# Patient Record
Sex: MALE | Race: WHITE | Employment: OTHER | URBAN - METROPOLITAN AREA
[De-identification: names, ages, dates, MRNs, and addresses within clinical notes are randomized per-mention and may not be internally consistent; named-entity substitution may affect disease eponyms.]

---

## 2017-02-06 ENCOUNTER — HOSPITAL ENCOUNTER (OUTPATIENT)
Dept: ULTRASOUND IMAGING | Age: 82
Discharge: HOME | End: 2017-02-06
Attending: INTERNAL MEDICINE
Payer: MEDICARE

## 2017-02-06 DIAGNOSIS — I10 ESSENTIAL HYPERTENSION, MALIGNANT: ICD-10-CM

## 2017-02-06 DIAGNOSIS — R79.89 OTHER ABNORMAL BLOOD CHEMISTRY: ICD-10-CM

## 2017-02-06 DIAGNOSIS — R80.9 PROTEINURIA: ICD-10-CM

## 2017-02-06 PROCEDURE — 76770 US EXAM ABDO BACK WALL COMP: CPT

## 2017-11-12 ENCOUNTER — APPOINTMENT (OUTPATIENT)
Dept: GENERAL RADIOLOGY | Age: 82
End: 2017-11-12
Attending: EMERGENCY MEDICINE
Payer: MEDICARE

## 2017-11-12 ENCOUNTER — HOSPITAL ENCOUNTER (EMERGENCY)
Age: 82
Discharge: ANOTHER HOSPITAL | End: 2017-11-12
Attending: EMERGENCY MEDICINE
Payer: MEDICARE

## 2017-11-12 ENCOUNTER — APPOINTMENT (OUTPATIENT)
Dept: CT IMAGING | Age: 82
End: 2017-11-12
Attending: EMERGENCY MEDICINE
Payer: MEDICARE

## 2017-11-12 VITALS
HEIGHT: 70 IN | WEIGHT: 200 LBS | TEMPERATURE: 98.3 F | HEART RATE: 77 BPM | OXYGEN SATURATION: 99 % | BODY MASS INDEX: 28.63 KG/M2 | RESPIRATION RATE: 15 BRPM | SYSTOLIC BLOOD PRESSURE: 125 MMHG | DIASTOLIC BLOOD PRESSURE: 76 MMHG

## 2017-11-12 DIAGNOSIS — S01.81XA FACIAL LACERATION, INITIAL ENCOUNTER: ICD-10-CM

## 2017-11-12 DIAGNOSIS — S09.90XA INJURY OF HEAD, INITIAL ENCOUNTER: ICD-10-CM

## 2017-11-12 DIAGNOSIS — R55 SYNCOPE AND COLLAPSE: Primary | ICD-10-CM

## 2017-11-12 LAB
ALBUMIN SERPL-MCNC: 3.4 G/DL (ref 3.5–5)
ALBUMIN/GLOB SERPL: 0.9 {RATIO} (ref 1.1–2.2)
ALP SERPL-CCNC: 89 U/L (ref 45–117)
ALT SERPL-CCNC: 21 U/L (ref 12–78)
ANION GAP SERPL CALC-SCNC: 10 MMOL/L (ref 5–15)
APTT PPP: 25 SEC (ref 22.1–32.5)
AST SERPL-CCNC: 26 U/L (ref 15–37)
BASOPHILS # BLD: 0 K/UL (ref 0–0.1)
BASOPHILS NFR BLD: 0 % (ref 0–1)
BILIRUB SERPL-MCNC: 0.8 MG/DL (ref 0.2–1)
BUN SERPL-MCNC: 28 MG/DL (ref 6–20)
BUN/CREAT SERPL: 16 (ref 12–20)
CALCIUM SERPL-MCNC: 8.7 MG/DL (ref 8.5–10.1)
CHLORIDE SERPL-SCNC: 105 MMOL/L (ref 97–108)
CO2 SERPL-SCNC: 22 MMOL/L (ref 21–32)
CREAT SERPL-MCNC: 1.79 MG/DL (ref 0.7–1.3)
EOSINOPHIL # BLD: 0.1 K/UL (ref 0–0.4)
EOSINOPHIL NFR BLD: 1 % (ref 0–7)
ERYTHROCYTE [DISTWIDTH] IN BLOOD BY AUTOMATED COUNT: 14 % (ref 11.5–14.5)
GLOBULIN SER CALC-MCNC: 3.6 G/DL (ref 2–4)
GLUCOSE SERPL-MCNC: 112 MG/DL (ref 65–100)
HCT VFR BLD AUTO: 41.8 % (ref 36.6–50.3)
HGB BLD-MCNC: 13.5 G/DL (ref 12.1–17)
INR PPP: 1.1 (ref 0.9–1.1)
LYMPHOCYTES # BLD: 1.7 K/UL (ref 0.8–3.5)
LYMPHOCYTES NFR BLD: 13 % (ref 12–49)
MCH RBC QN AUTO: 31.6 PG (ref 26–34)
MCHC RBC AUTO-ENTMCNC: 32.3 G/DL (ref 30–36.5)
MCV RBC AUTO: 97.9 FL (ref 80–99)
MONOCYTES # BLD: 1 K/UL (ref 0–1)
MONOCYTES NFR BLD: 7 % (ref 5–13)
NEUTS SEG # BLD: 10.2 K/UL (ref 1.8–8)
NEUTS SEG NFR BLD: 79 % (ref 32–75)
PLATELET # BLD AUTO: 201 K/UL (ref 150–400)
POTASSIUM SERPL-SCNC: 5.2 MMOL/L (ref 3.5–5.1)
PROT SERPL-MCNC: 7 G/DL (ref 6.4–8.2)
PROTHROMBIN TIME: 11.5 SEC (ref 9–11.1)
RBC # BLD AUTO: 4.27 M/UL (ref 4.1–5.7)
SODIUM SERPL-SCNC: 137 MMOL/L (ref 136–145)
THERAPEUTIC RANGE,PTTT: NORMAL SECS (ref 58–77)
WBC # BLD AUTO: 13 K/UL (ref 4.1–11.1)

## 2017-11-12 PROCEDURE — 85730 THROMBOPLASTIN TIME PARTIAL: CPT | Performed by: EMERGENCY MEDICINE

## 2017-11-12 PROCEDURE — 70486 CT MAXILLOFACIAL W/O DYE: CPT

## 2017-11-12 PROCEDURE — 72125 CT NECK SPINE W/O DYE: CPT

## 2017-11-12 PROCEDURE — 77030031132 HC SUT NYL COVD -A

## 2017-11-12 PROCEDURE — 93005 ELECTROCARDIOGRAM TRACING: CPT

## 2017-11-12 PROCEDURE — 99285 EMERGENCY DEPT VISIT HI MDM: CPT

## 2017-11-12 PROCEDURE — 70450 CT HEAD/BRAIN W/O DYE: CPT

## 2017-11-12 PROCEDURE — 71010 XR CHEST PORT: CPT

## 2017-11-12 PROCEDURE — 75810000293 HC SIMP/SUPERF WND  RPR

## 2017-11-12 PROCEDURE — 80053 COMPREHEN METABOLIC PANEL: CPT | Performed by: EMERGENCY MEDICINE

## 2017-11-12 PROCEDURE — 36415 COLL VENOUS BLD VENIPUNCTURE: CPT | Performed by: EMERGENCY MEDICINE

## 2017-11-12 PROCEDURE — 85610 PROTHROMBIN TIME: CPT | Performed by: EMERGENCY MEDICINE

## 2017-11-12 PROCEDURE — 74011000250 HC RX REV CODE- 250: Performed by: EMERGENCY MEDICINE

## 2017-11-12 PROCEDURE — 85025 COMPLETE CBC W/AUTO DIFF WBC: CPT | Performed by: EMERGENCY MEDICINE

## 2017-11-12 RX ORDER — BACITRACIN ZINC 500 [USP'U]/G
1 CREAM TOPICAL
Status: COMPLETED | OUTPATIENT
Start: 2017-11-12 | End: 2017-11-12

## 2017-11-12 RX ORDER — LIDOCAINE HYDROCHLORIDE AND EPINEPHRINE 10; 10 MG/ML; UG/ML
5 INJECTION, SOLUTION INFILTRATION; PERINEURAL ONCE
Status: COMPLETED | OUTPATIENT
Start: 2017-11-12 | End: 2017-11-12

## 2017-11-12 RX ADMIN — LIDOCAINE HYDROCHLORIDE,EPINEPHRINE BITARTRATE 50 MG: 10; .01 INJECTION, SOLUTION INFILTRATION; PERINEURAL at 17:51

## 2017-11-12 RX ADMIN — BACITRACIN ZINC 1 PACKET: 500 OINTMENT TOPICAL at 19:34

## 2017-11-12 NOTE — ED NOTES
Pt's wife requested that the pt be put on O2 because \"he's breathing heavy\". Pt's SPO2 readings consistently above 96% on room air. Pt has PMH of COPD. Observed using abdominal breathing with inspiratory effort < expiratory effort. Pt positioned at 90 degrees, and 2L NC applied for patient and family comfort.

## 2017-11-12 NOTE — ED PROVIDER NOTES
HPI Comments: [de-identified] y.o. 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 Patient states that he had a syncopal episode and fell backwards down a half flight of stairs and landed on his left side. Patient arrives with a laceration to his left forehead. He currently complains of pain to his left side as well as some upper back pain. He denies having any neck pain or abdominal pain. Patient takes 81 mg aspirin daily. There are no other acute medical concerns at this time. Social hx: former smoker, no EtOH use, no drug use  PCP: Quinn Fuller MD    Note written by Catherine Odonnell, as dictated by Blayne Alacron MD 4:43 PM      The history is provided by the patient. No  was used. Past Medical History:   Diagnosis Date    Abdominal aneurysm without mention of rupture ([address]     \"small\"    Aneurysm ([address]     aortic small    Anxiety     Arthritis     Benign hypertensive heart disease without heart failure     Chronic obstructive pulmonary disease (HCC)     Chronic pain     lower back    Hypercholesterolemia     Hypertension     Ill-defined condition     kidney stones    Nausea & vomiting        Past Surgical History:   Procedure Laterality Date    HX CATARACT REMOVAL Bilateral 2013, 2014    HX ORTHOPAEDIC  1957    rods in femurs placed    HX TONSILLECTOMY      childhood    STRESS TEST MYOVIEW  2008    no fixed or reversible deficits. Gated EF (%): 61.          Family History:   Problem Relation Age of Onset    Heart Disease Father     Alzheimer Mother     Heart Disease Maternal Aunt        Social History     Social History    Marital status:      Spouse name: N/A    Number of children: N/A    Years of education: N/A     Occupational History    Not on file.      Social History Main Topics    Smoking status: Former Smoker     Packs/day: 1.00     Years: 60.00 Quit date: 4/6/2014    Smokeless tobacco: Never Used    Alcohol use No    Drug use: No    Sexual activity: Not on file     Other Topics Concern    Not on file     Social History Narrative    No narrative on file         ALLERGIES: Peanut    Review of Systems   Constitutional: Negative for fever. Eyes: Negative for visual disturbance. Respiratory: Negative for cough, shortness of breath and wheezing. Cardiovascular: Negative for chest pain and leg swelling. Gastrointestinal: Negative for abdominal pain, diarrhea, nausea and vomiting. Genitourinary: Negative for dysuria. Musculoskeletal: Positive for back pain. Negative for neck stiffness. Skin: Positive for wound. Negative for rash. Neurological: Positive for syncope. Negative for headaches. Psychiatric/Behavioral: Negative for confusion. All other systems reviewed and are negative. Vitals:    11/12/17 1635 11/12/17 1641   BP: (!) 187/97    Pulse:  82   Resp: 16    Temp: 97.7 °F (36.5 °C)    SpO2: 97%             Physical Exam   Constitutional: He appears well-developed and well-nourished. No distress. HENT:   Head: Normocephalic. Head is with laceration (5 cm jagged laceration to left forehead). Marked swelling to periorbital area and left cheek. Eyes:   3 cm laceration to left lower lid. Cornea and anterior chambers are normal. Unable to fully see pupil. Neck: Normal range of motion. Cardiovascular: Normal rate and regular rhythm. No murmur heard. Pulmonary/Chest: Effort normal and breath sounds normal. No respiratory distress. Abdominal: Soft. There is no tenderness. Musculoskeletal: Normal range of motion. He exhibits no edema. Neurological: He is alert. He has normal strength. No cranial nerve deficit. Skin: Skin is warm and dry. Psychiatric: He has a normal mood and affect. His behavior is normal.   Nursing note and vitals reviewed.   Note written by Catherine Soto, as dictated by Vikas Harris MD 4:43 PM    [facility]  ED Course       Wound Repair  Date/Time: 11/12/2017 7:14 PM  Performed by: attendingPreparation: skin prepped with Betadine  Pre-procedure re-eval: Immediately prior to the procedure, the patient was reevaluated and found suitable for the planned procedure and any planned medications. Location details: face (left forehead)  Wound length:2.6 - 7.5 cm (7 cm)  Anesthesia: local infiltration    Anesthesia:  Local Anesthetic: lidocaine 1% with epinephrine  Foreign bodies: no foreign bodies  Skin closure: 4-0 nylon  Technique: simple  Patient tolerance: Patient tolerated the procedure well with no immediate complications  My total time at bedside, performing this procedure was 1-15 minutes. Comments: Clots evacuated from laceration  Wound Repair  Date/Time: 11/12/2017 7:18 PM  Performed by: attendingPreparation: skin prepped with Betadine  Pre-procedure re-eval: Immediately prior to the procedure, the patient was reevaluated and found suitable for the planned procedure and any planned medications. Location details: left eyelid  Wound length:2.6 - 7.5 cm (3 cm)  Anesthesia: local infiltration    Anesthesia:  Local Anesthetic: lidocaine 1% with epinephrine  Skin closure: 6-0 nylon  My total time at bedside, performing this procedure was 1-15 minutes. ED EKG interpretation:  Rhythm: normal sinus rhythm; and regular . Rate (approx.): 77; Axis: normal; ST/T wave: normal; RBBB. Note written by Catherine Peñaloza, as dictated by Bety Tse MD 5:48 PM    CONSULT NOTE:  5:49 PM Bety Tse MD spoke with Dr. Ty Fink, Consult for Hospitalist.  Discussed available diagnostic tests and clinical findings. Dr. Ty Fink states that [facility] does not admit syncope with trauma component. Spoke to patient and family who are adamant against going to [facility]. Reluctantly agreed to [facility] transfer.     CONSULT NOTE:  6:27 PM Btey Tse MD spoke with Dr. Kevyn Godoy, Consult for Hospitalist.  Discussed available diagnostic tests and clinical findings. He is in agreement with care plans as outlined. Dr. Red Ariza will admit patient at [facility]. PROGRESS NOTE:  7:05 PM No beds available at [facility] to transfer patient. PROGRESS NOTE:  7:34 PM Dr. Zak Klein at [facility] accepts transfer for syncope/head injury.

## 2017-11-12 NOTE — ED TRIAGE NOTES
Pt arrives via EMS after falling backwards down a half-flight of stairs. Fall unwitnessed. Pt had LOC and arrived with lac and bruising over left eye. Pt arrives in C-collar.

## 2017-11-12 NOTE — CONSULTS
Discussed case with Dr. Suellen Canela, ED physician. This patient fell down a flight of stairs, complicated by head trauma. This is a trauma case. Dr. Suellen Canela will call [facility] or [facility] for admission.

## 2017-11-13 LAB
ATRIAL RATE: 77 BPM
CALCULATED P AXIS, ECG09: 33 DEGREES
CALCULATED R AXIS, ECG10: 77 DEGREES
CALCULATED T AXIS, ECG11: 51 DEGREES
DIAGNOSIS, 93000: NORMAL
P-R INTERVAL, ECG05: 204 MS
Q-T INTERVAL, ECG07: 398 MS
QRS DURATION, ECG06: 122 MS
QTC CALCULATION (BEZET), ECG08: 450 MS
VENTRICULAR RATE, ECG03: 77 BPM

## 2017-11-13 NOTE — ED NOTES
Assumed care of patient. Introduced self as primary nurse using [address] [place] [address] Nw. Stretcher in low locked position with call bell within reach. Pt updated on plan of care and wait times. Pt instructed to use call bell if assistance is needed.

## 2017-11-13 NOTE — ED NOTES
Per Stefany Block, RN supervisor at [facility]: there are no beds available for ICU, step down, or Neuro ICU. MD and family notified.

## 2017-11-13 NOTE — ED NOTES
PROGRESS NOTE:  8:56 PM Patient fell after standing up and slipping. No injury. No syncopal episode prior to falling.

## 2017-11-13 NOTE — ED NOTES
Updated report called to receiving facility/RN  Transport Team arrived at 2030 to assume care and transport of patient. PT is being transported to [facility] by [facility]. Pt is  hemodynamically stable at this time, Pt is alert and oriented x 4, Neuro WDL, Respiratory status is not WDL. There are  changes to previous nursing assesments at this time: pt fall prior to departure. Transport team confirmed understanding of need for monitoring and fall precautions enroute. Pt initial complaint has not improved. Education provided to patient regarding transportation and risks/expectations. Transportation team introduced to patient at the bedside. Pt family/Friends are not present and family/friend will not be riding with patient. Final set of vital signs taken. Report given to transport team via SBAR, MAR, HX, and Recent results. Opportunity for questions/clarification provided. Pt placed on stretcher at 2054, transport team departed with patient at 2058. Pt was stable at time of transport.

## 2017-11-13 NOTE — ED NOTES
.fall  Post Fall Documentation      Constantino Elam witnessed fall occurred on 11/12/2017 at 2048. The answers to the following questions summarize the fall:     · In the patient's own words,:  · What was he/she doing when he/she fell? Transferring from hospital stretcher to EMS stretcher    · What are his/her complaints? none    · Nurse:  · Document observation, treatment, conversation, follow-up, and patient response. EMS stretcher observed to be several feet away from ED stretcher, and pt found between the two. Unwitnessed by [facility] staff but RN heard pt hit the floor; fall was witnessed by [facility] EMS team. [facility] transport team notified during report that the patient was a high fall risk with a head injury. Pt denies any complaints or pain, and was observed to have no additional wounds or bruising as a result of this incident. Nursing Supervisor, Charge RN, and [facility] management notified of incident, and physician cleared pt to continue with transport. · What was the patient's condition when found (i.e., pain, symptoms, cuts, bruises)? Pt did not have fall risk gripper socks on. Pt denies any pain and had no complaints. · What specific complaints did the patient have? None    · What did the staff do when patient was found (i.e., vital signs, returned to bed with fall alarm, side rails up)? ANTHONY Bobby heard patient hit the floor. Walked into the room to find EMS stretcher positioned several feet away from ED stretcher, with patient sitting on floor between the two and EMS transporters assisting him up. Pt's pressure bandage on wound repairs fell off. Patient assisted to EMS stretcher, buckled in, and pressure bandage reapplied. Dr Ron King reassessed patient and consented continuing the transport as planned. · Which physician was notified?  Dr. Kingsley Barragan

## 2017-11-13 NOTE — ROUTINE PROCESS
TRANSFER - OUT REPORT:    Verbal report given to Rahul Purcell RN on Nhung Mcginnis  being transferred to [facility] ED for urgent transfer       Report consisted of patients Situation, Background, Assessment and   Recommendations(SBAR). Information from the following report(s) SBAR, ED Summary, Procedure Summary, MAR and Recent Results was reviewed with the receiving nurse. Lines:   Peripheral IV 11/12/17 Left Hand (Active)   Site Assessment Clean, dry, & intact 11/12/2017  4:47 PM   Phlebitis Assessment 0 11/12/2017  4:47 PM   Infiltration Assessment 0 11/12/2017  4:47 PM   Dressing Status Clean, dry, & intact 11/12/2017  4:47 PM   Hub Color/Line Status Green 11/12/2017  4:47 PM   Action Taken Catheter retaped 11/12/2017  4:47 PM        Opportunity for questions and clarification was provided.       Patient transported with:   Monitor

## 2017-11-13 NOTE — ED NOTES
Pt's skin cleaned, bacitracin and pressure dressing applied. Per Dr. Ricky Viveros, pt allowed to have water by mouth.

## 2019-09-10 ENCOUNTER — HOSPITAL ENCOUNTER (OUTPATIENT)
Dept: ULTRASOUND IMAGING | Age: 84
Discharge: HOME | End: 2019-09-10
Attending: INTERNAL MEDICINE
Payer: MEDICARE

## 2019-09-10 DIAGNOSIS — N18.30 CHRONIC KIDNEY DISEASE, STAGE III (MODERATE) (HCC): ICD-10-CM

## 2019-09-10 PROCEDURE — 76770 US EXAM ABDO BACK WALL COMP: CPT
